# Patient Record
Sex: FEMALE | NOT HISPANIC OR LATINO | Employment: STUDENT | URBAN - METROPOLITAN AREA
[De-identification: names, ages, dates, MRNs, and addresses within clinical notes are randomized per-mention and may not be internally consistent; named-entity substitution may affect disease eponyms.]

---

## 2023-11-01 ENCOUNTER — OFFICE VISIT (OUTPATIENT)
Dept: URGENT CARE | Facility: CLINIC | Age: 33
End: 2023-11-01
Payer: COMMERCIAL

## 2023-11-01 VITALS
HEART RATE: 91 BPM | BODY MASS INDEX: 39.2 KG/M2 | HEIGHT: 71 IN | TEMPERATURE: 99 F | OXYGEN SATURATION: 100 % | SYSTOLIC BLOOD PRESSURE: 123 MMHG | DIASTOLIC BLOOD PRESSURE: 72 MMHG | WEIGHT: 280 LBS | RESPIRATION RATE: 17 BRPM

## 2023-11-01 DIAGNOSIS — J02.0 STREP THROAT: Primary | ICD-10-CM

## 2023-11-01 DIAGNOSIS — J02.9 SORE THROAT: ICD-10-CM

## 2023-11-01 LAB
CTP QC/QA: YES
S PYO RRNA THROAT QL PROBE: POSITIVE

## 2023-11-01 PROCEDURE — 87880 STREP A ASSAY W/OPTIC: CPT | Mod: QW,,,

## 2023-11-01 PROCEDURE — 99213 PR OFFICE/OUTPT VISIT, EST, LEVL III, 20-29 MIN: ICD-10-PCS | Mod: 25,S$GLB,,

## 2023-11-01 PROCEDURE — 99213 OFFICE O/P EST LOW 20 MIN: CPT | Mod: 25,S$GLB,,

## 2023-11-01 PROCEDURE — 87880 POCT RAPID STREP A: ICD-10-PCS | Mod: QW,,,

## 2023-11-01 RX ORDER — CEFDINIR 300 MG/1
300 CAPSULE ORAL 2 TIMES DAILY
Qty: 20 CAPSULE | Refills: 0 | Status: SHIPPED | OUTPATIENT
Start: 2023-11-01 | End: 2023-11-11

## 2023-11-01 RX ORDER — AMOXICILLIN 875 MG/1
875 TABLET, FILM COATED ORAL EVERY 12 HOURS
Qty: 20 TABLET | Refills: 0 | Status: SHIPPED | OUTPATIENT
Start: 2023-11-01 | End: 2023-11-01

## 2023-11-01 NOTE — PROGRESS NOTES
"Subjective:      Patient ID: Kathleen LUCERO is a 33 y.o. female.    Vitals:  height is 5' 11" (1.803 m) and weight is 127 kg (280 lb). Her temperature is 99.2 °F (37.3 °C). Her blood pressure is 123/72 and her pulse is 91. Her respiration is 17 and oxygen saturation is 100%.     Chief Complaint: Sore Throat (Sever throat pain x 1 day)    This is a 33 y.o. female who presents today with a chief complaint of  Patient presents with:  Sore Throat: Sever throat pain x 1 day        Sore Throat   This is a new problem. The current episode started today. The problem has been rapidly worsening. There has been no fever. The fever has been present for Less than 1 day. The pain is at a severity of 8/10. Associated symptoms include trouble swallowing. She has had exposure to strep. She has tried NSAIDs for the symptoms. The treatment provided no relief.       Constitution: Positive for fever.   HENT:  Positive for sore throat and trouble swallowing.    Neck: neck negative.   Cardiovascular: Negative.    Eyes: Negative.    Respiratory: Negative.     Gastrointestinal: Negative.    Endocrine: negative.   Genitourinary: Negative.    Musculoskeletal: Negative.    Skin: Negative.    Allergic/Immunologic: Negative.    Neurological: Negative.    Hematologic/Lymphatic: Negative.    Psychiatric/Behavioral: Negative.        Objective:     Physical Exam   Constitutional: She is oriented to person, place, and time.   HENT:   Head: Normocephalic and atraumatic.   Mouth/Throat: Oropharyngeal exudate and posterior oropharyngeal erythema present.   Eyes: Conjunctivae are normal. Pupils are equal, round, and reactive to light. Extraocular movement intact   Neck: Neck supple.   Cardiovascular: Normal rate and normal pulses.   Pulmonary/Chest: Effort normal.   Abdominal: Normal appearance.   Musculoskeletal: Normal range of motion.         General: Normal range of motion.   Neurological: no focal deficit. She is alert, oriented to person, place, " and time and at baseline.   Skin: Skin is warm.   Psychiatric: Her behavior is normal. Mood, judgment and thought content normal.   Nursing note and vitals reviewed.      Assessment:     Results for orders placed or performed in visit on 11/01/23   POCT rapid strep A   Result Value Ref Range    Rapid Strep A Screen Positive (A) Negative     Acceptable Yes        1. Strep throat    2. Sore throat        Plan:       Strep throat  -     Discontinue: amoxicillin (AMOXIL) 875 MG tablet; Take 1 tablet (875 mg total) by mouth every 12 (twelve) hours. for 10 days  Dispense: 20 tablet; Refill: 0  -     cefdinir (OMNICEF) 300 MG capsule; Take 1 capsule (300 mg total) by mouth 2 (two) times daily. for 10 days  Dispense: 20 capsule; Refill: 0    Sore throat  -     POCT rapid strep A  -     Discontinue: amoxicillin (AMOXIL) 875 MG tablet; Take 1 tablet (875 mg total) by mouth every 12 (twelve) hours. for 10 days  Dispense: 20 tablet; Refill: 0

## 2023-11-01 NOTE — PATIENT INSTRUCTIONS
You must understand that you've received an Urgent Care treatment only and that you may be released before all your medical problems are known or treated. You, the patient, will arrange for follow up care as instructed.  Follow up with your PCP or specialty clinic as directed in the next 1-2 weeks if not improved or as needed.  You can call [phone] to schedule an appointment with the appropriate provider.  If your condition worsens we recommend that you receive another evaluation at the emergency room immediately or contact your primary medical clinics after hours call service to discuss your concerns.  Please return here or go to the Emergency Department for any concerns or worsening of condition.  Please if you smoke please consider quitting. [facility] cessation hotline number is [phone], available at this number is free counseling and medications to live a healthier life!         If you were prescribed a narcotic or controlled medication, do not drive or operate heavy equipment or machinery while taking these medications.

## 2024-01-23 ENCOUNTER — OFFICE VISIT (OUTPATIENT)
Dept: URGENT CARE | Facility: CLINIC | Age: 34
End: 2024-01-23
Payer: COMMERCIAL

## 2024-01-23 VITALS
HEART RATE: 107 BPM | WEIGHT: 280 LBS | TEMPERATURE: 98 F | HEIGHT: 71 IN | RESPIRATION RATE: 18 BRPM | SYSTOLIC BLOOD PRESSURE: 138 MMHG | OXYGEN SATURATION: 98 % | BODY MASS INDEX: 39.2 KG/M2 | DIASTOLIC BLOOD PRESSURE: 85 MMHG

## 2024-01-23 DIAGNOSIS — J02.0 STREP PHARYNGITIS: Primary | ICD-10-CM

## 2024-01-23 DIAGNOSIS — J02.9 SORE THROAT: ICD-10-CM

## 2024-01-23 LAB
CTP QC/QA: YES
MOLECULAR STREP A: POSITIVE

## 2024-01-23 PROCEDURE — 87651 STREP A DNA AMP PROBE: CPT | Mod: QW,,, | Performed by: NURSE PRACTITIONER

## 2024-01-23 PROCEDURE — 99213 OFFICE O/P EST LOW 20 MIN: CPT | Mod: S$GLB,,, | Performed by: NURSE PRACTITIONER

## 2024-01-23 RX ORDER — CEFDINIR 300 MG/1
300 CAPSULE ORAL 2 TIMES DAILY
Qty: 20 CAPSULE | Refills: 0 | Status: SHIPPED | OUTPATIENT
Start: 2024-01-23 | End: 2024-02-02

## 2024-01-23 NOTE — PATIENT INSTRUCTIONS
You must understand that you've received an Urgent Care treatment only and that you may be released before all your medical problems are known or treated. You, the patient, will arrange for follow up care as instructed.  Follow up with your PCP or specialty clinic as directed in the next 1-2 weeks if not improved or as needed.  You can call [phone] to schedule an appointment with the appropriate provider.  If your condition worsens we recommend that you receive another evaluation at the emergency room immediately or contact your primary medical clinics after hours call service to discuss your concerns.  Please return here or go to the Emergency Department for any concerns or worsening of condition.  Please if you smoke please consider quitting. [facility] Smoke cessation hotline number is [phone], available at this number is free counseling and medications to live a healthier life!       If you were prescribed a narcotic or controlled medication, do not drive or operate heavy equipment or machinery while taking these medications.    If you were not prescribed an antibiotic and your not better please return for a recheck. Antibiotic therapy is not always indicated initially.   Please attempt over the counter medications, give it time and try Echinacea, Zinc and Vitamin C to fight common colds and virus.

## 2024-01-23 NOTE — PROGRESS NOTES
"Subjective:       Patient ID: Kathleen Roberto is a 33 y.o. female.    Vitals:  height is 5' 11" (1.803 m) and weight is 127 kg (280 lb). Her oral temperature is 98.2 °F (36.8 °C). Her blood pressure is 138/85 and her pulse is 107. Her respiration is 18 and oxygen saturation is 98%.     Chief Complaint: Sore Throat (Started this morning with a sore throat, now the throat is worse and she is having body aches.  She has been exposed to strep and would like to be tested for strep. She took ibuprofen this morning with no relief.)    This is a 33 y.o. female who presents today with a chief complaint of Started this morning with a sore throat, now the throat is worse and she is having body aches.  She has been exposed to strep and would like to be tested for strep. She took ibuprofen this morning with no relief.  Patient presents with:  Sore Throat: Started this morning with a sore throat, now the throat is worse and she is having body aches.  She has been exposed to strep and would like to be tested for strep. She took ibuprofen this morning with no relief.         Sore Throat   This is a new problem. The current episode started today. The problem has been gradually worsening. The pain is at a severity of 5/10. The pain is moderate. She has had exposure to strep. She has tried NSAIDs for the symptoms. The treatment provided no relief.       HENT:  Positive for sore throat.            Objective:      Physical Exam   Constitutional: She is oriented to person, place, and time. She appears well-developed.   HENT:   Head: Normocephalic and atraumatic.   Ears:   Right Ear: External ear normal.   Left Ear: External ear normal.   Nose: Rhinorrhea present.   Mouth/Throat: Mucous membranes are normal. Posterior oropharyngeal edema and posterior oropharyngeal erythema present.   Eyes: Conjunctivae and lids are normal.   Neck: Trachea normal. Neck supple.   Cardiovascular: Normal rate, regular rhythm and normal heart sounds. "   Pulmonary/Chest: Effort normal and breath sounds normal. No respiratory distress.   Abdominal: Normal appearance and bowel sounds are normal. She exhibits no distension and no mass. Soft. There is no abdominal tenderness.   Musculoskeletal: Normal range of motion.         General: Normal range of motion.   Neurological: She is alert and oriented to person, place, and time. She has normal strength.   Skin: Skin is warm, dry, intact, not diaphoretic and not pale.   Psychiatric: Her speech is normal and behavior is normal. Judgment and thought content normal.   Nursing note and vitals reviewed.        Past medical history and current medications reviewed.       Assessment:           1. Strep pharyngitis    2. Sore throat              Plan:         Strep pharyngitis  -     cefdinir (OMNICEF) 300 MG capsule; Take 1 capsule (300 mg total) by mouth 2 (two) times daily. for 10 days  Dispense: 20 capsule; Refill: 0    Sore throat  -     POCT Strep A, Molecular             Patient Instructions     You must understand that you've received an Urgent Care treatment only and that you may be released before all your medical problems are known or treated. You, the patient, will arrange for follow up care as instructed.  Follow up with your PCP or specialty clinic as directed in the next 1-2 weeks if not improved or as needed.  You can call [phone] to schedule an appointment with the appropriate provider.  If your condition worsens we recommend that you receive another evaluation at the emergency room immediately or contact your primary medical clinics after hours call service to discuss your concerns.  Please return here or go to the Emergency Department for any concerns or worsening of condition.  Please if you smoke please consider quitting. [facility] Smoke cessation hotline number is [phone], available at this number is free counseling and medications to live a healthier life!       If you were prescribed a narcotic  or controlled medication, do not drive or operate heavy equipment or machinery while taking these medications.    If you were not prescribed an antibiotic and your not better please return for a recheck. Antibiotic therapy is not always indicated initially.   Please attempt over the counter medications, give it time and try Echinacea, Zinc and Vitamin C to fight common colds and virus.

## 2024-01-23 NOTE — LETTER
January 23, 2024      [facility]  [address] [place] [address], [address]  [place] [address]  Phone: [phone]  Fax: [phone]       Patient: Kathleen Roberto   YOB: 1990  Date of Visit: 01/23/2024    To Whom It May Concern:    Deanne Roberto  was at [facility] on 01/23/2024. The patient may return to work/school on 01/25/2024 with out   restrictions. If you have any questions or concerns, or if I can be of further assistance, please do not hesitate to contact me.    Sincerely,    ROMANA Ambrosio

## 2024-07-03 ENCOUNTER — OFFICE VISIT (OUTPATIENT)
Dept: URGENT CARE | Facility: CLINIC | Age: 34
End: 2024-07-03
Payer: COMMERCIAL

## 2024-07-03 VITALS
HEIGHT: 71 IN | BODY MASS INDEX: 39.62 KG/M2 | WEIGHT: 283 LBS | TEMPERATURE: 98 F | RESPIRATION RATE: 17 BRPM | DIASTOLIC BLOOD PRESSURE: 74 MMHG | SYSTOLIC BLOOD PRESSURE: 124 MMHG | HEART RATE: 90 BPM | OXYGEN SATURATION: 96 %

## 2024-07-03 DIAGNOSIS — B96.89 ACUTE BACTERIAL SINUSITIS: Primary | ICD-10-CM

## 2024-07-03 DIAGNOSIS — J01.90 ACUTE BACTERIAL SINUSITIS: Primary | ICD-10-CM

## 2024-07-03 DIAGNOSIS — R09.81 SINUS CONGESTION: ICD-10-CM

## 2024-07-03 DIAGNOSIS — R05.1 ACUTE COUGH: ICD-10-CM

## 2024-07-03 LAB
CTP QC/QA: YES
SARS-COV-2 AG RESP QL IA.RAPID: NEGATIVE

## 2024-07-03 PROCEDURE — 87811 SARS-COV-2 COVID19 W/OPTIC: CPT | Mod: QW,S$GLB,, | Performed by: NURSE PRACTITIONER

## 2024-07-03 PROCEDURE — 99213 OFFICE O/P EST LOW 20 MIN: CPT | Mod: S$GLB,,, | Performed by: NURSE PRACTITIONER

## 2024-07-03 RX ORDER — PROMETHAZINE HYDROCHLORIDE AND DEXTROMETHORPHAN HYDROBROMIDE 6.25; 15 MG/5ML; MG/5ML
5 SYRUP ORAL EVERY 6 HOURS PRN
Qty: 120 ML | Refills: 0 | Status: SHIPPED | OUTPATIENT
Start: 2024-07-03 | End: 2024-07-13

## 2024-07-03 RX ORDER — AZITHROMYCIN 250 MG/1
TABLET, FILM COATED ORAL
Qty: 6 TABLET | Refills: 0 | Status: SHIPPED | OUTPATIENT
Start: 2024-07-03

## 2024-07-03 NOTE — PROGRESS NOTES
"Subjective:      Patient ID: Kathleen Roberto is a 34 y.o. female.    Vitals:  height is 5' 11" (1.803 m) and weight is 128.4 kg (283 lb). Her oral temperature is 97.7 °F (36.5 °C). Her blood pressure is 124/74 and her pulse is 90. Her respiration is 17 and oxygen saturation is 96%.     Chief Complaint: Sinus Problem    This is a 34 y.o. female who presents today with a chief complaint of sinus congestion, rhinorrhea, headache, body aches, and sore throat x 2 days.        Sinus Problem  This is a new problem. The current episode started in the past 7 days. The problem has been gradually worsening since onset. There has been no fever. Her pain is at a severity of 4/10. The pain is mild. Associated symptoms include congestion, coughing, ear pain, headaches, sinus pressure, sneezing and a sore throat. Pertinent negatives include no shortness of breath. Treatments tried: Zyrtec-D and NSAIDs. The treatment provided moderate relief.       Constitution: Negative.   HENT:  Positive for ear pain, congestion, sinus pain, sinus pressure and sore throat.    Respiratory:  Positive for cough. Negative for shortness of breath.    Allergic/Immunologic: Positive for sneezing.   Neurological:  Positive for headaches.      Objective:     Physical Exam   Constitutional: She is oriented to person, place, and time. She appears well-developed. She is cooperative.  Non-toxic appearance. She does not appear ill. No distress.   HENT:   Head: Normocephalic and atraumatic.   Ears:   Right Ear: Hearing, tympanic membrane, external ear and ear canal normal.   Left Ear: Hearing, tympanic membrane, external ear and ear canal normal.   Nose: No mucosal edema, rhinorrhea or nasal deformity. No epistaxis. Right sinus exhibits maxillary sinus tenderness and frontal sinus tenderness. Left sinus exhibits maxillary sinus tenderness and frontal sinus tenderness.   Mouth/Throat: Uvula is midline, oropharynx is clear and moist and mucous membranes are normal. " No trismus in the jaw. Normal dentition. No uvula swelling. No oropharyngeal exudate, posterior oropharyngeal edema or posterior oropharyngeal erythema.   Eyes: Conjunctivae and lids are normal. No scleral icterus.   Neck: Trachea normal and phonation normal. Neck supple. No edema present. No erythema present. No neck rigidity present.   Cardiovascular: Normal rate, regular rhythm, normal heart sounds and normal pulses.   Pulmonary/Chest: Effort normal and breath sounds normal. No respiratory distress. She has no decreased breath sounds. She has no rhonchi.   Abdominal: Normal appearance.   Musculoskeletal: Normal range of motion.         General: No deformity. Normal range of motion.   Neurological: She is alert and oriented to person, place, and time. She exhibits normal muscle tone. Coordination normal.   Skin: Skin is warm, dry, intact, not diaphoretic and not pale.   Psychiatric: Her speech is normal and behavior is normal. Judgment and thought content normal.   Nursing note and vitals reviewed.    Results for orders placed or performed in visit on 07/03/24   SARS Coronavirus 2 Antigen, POCT Manual Read   Result Value Ref Range    SARS Coronavirus 2 Antigen Negative Negative     Acceptable Yes       Assessment:     1. Acute bacterial sinusitis    2. Sinus congestion    3. Acute cough        Plan:       Acute bacterial sinusitis  -     azithromycin (Z-ANGELO) 250 MG tablet; Take 2 tablets by mouth on day 1; Then Take 1 tablet by mouth on days 2-5  Dispense: 6 tablet; Refill: 0    Sinus congestion  -     SARS Coronavirus 2 Antigen, POCT Manual Read    Acute cough  -     promethazine-dextromethorphan (PROMETHAZINE-DM) 6.25-15 mg/5 mL Syrp; Take 5 mLs by mouth every 6 (six) hours as needed (cough).  Dispense: 120 mL; Refill: 0                Patient Instructions   Please return here or go to the Emergency Department for any concerns or worsening of condition.  Please drink plenty of fluids.  Please get  plenty of rest.  If you were prescribed antibiotics, please take them to completion.  If you do not have Hypertension or any history of palpitations, it is ok to take over the counter Sudafed or Mucinex D or Allegra-D or Claritin-D or Zyrtec-D.  If you do take one of the above, it is ok to combine that with plain over the counter Mucinex or Allegra or Claritin or Zyrtec.  If for example you are taking Zyrtec -D, you can combine that with Mucinex, but not Mucinex-D.  If you are taking Mucinex-D, you can combine that with plain Allegra or Claritin or Zyrtec.   If you do have Hypertension or palpitations, it is safe to take Coricidin HBP for relief of sinus symptoms.  If not allergic, please take over the counter Tylenol (Acetaminophen) and/or Motrin (Ibuprofen) as directed for control of pain and/or fever.  Please follow up with your primary care doctor or specialist as needed.    If you  smoke, please stop smoking.          Herve Padilla, MACHELLEP-C

## 2024-08-19 ENCOUNTER — OFFICE VISIT (OUTPATIENT)
Dept: URGENT CARE | Facility: CLINIC | Age: 34
End: 2024-08-19
Payer: COMMERCIAL

## 2024-08-19 VITALS
WEIGHT: 293 LBS | RESPIRATION RATE: 19 BRPM | SYSTOLIC BLOOD PRESSURE: 114 MMHG | OXYGEN SATURATION: 98 % | HEART RATE: 74 BPM | DIASTOLIC BLOOD PRESSURE: 73 MMHG | HEIGHT: 71 IN | TEMPERATURE: 99 F | BODY MASS INDEX: 41.02 KG/M2

## 2024-08-19 DIAGNOSIS — R05.9 COUGH, UNSPECIFIED TYPE: ICD-10-CM

## 2024-08-19 DIAGNOSIS — R09.81 NASAL CONGESTION: ICD-10-CM

## 2024-08-19 DIAGNOSIS — U07.1 COVID-19: Primary | ICD-10-CM

## 2024-08-19 LAB
CTP QC/QA: YES
SARS-COV-2 AG RESP QL IA.RAPID: POSITIVE

## 2024-08-19 PROCEDURE — 87811 SARS-COV-2 COVID19 W/OPTIC: CPT | Mod: QW,S$GLB,,

## 2024-08-19 PROCEDURE — 99214 OFFICE O/P EST MOD 30 MIN: CPT | Mod: S$GLB,,,

## 2024-08-19 RX ORDER — PROMETHAZINE HYDROCHLORIDE AND DEXTROMETHORPHAN HYDROBROMIDE 6.25; 15 MG/5ML; MG/5ML
5 SYRUP ORAL EVERY 6 HOURS PRN
Qty: 120 ML | Refills: 0 | Status: SHIPPED | OUTPATIENT
Start: 2024-08-19

## 2024-08-19 RX ORDER — FLUTICASONE PROPIONATE 50 MCG
1 SPRAY, SUSPENSION (ML) NASAL DAILY
Qty: 9.9 ML | Refills: 0 | Status: SHIPPED | OUTPATIENT
Start: 2024-08-19

## 2024-08-19 RX ORDER — LORATADINE AND PSEUDOEPHEDRINE SULFATE 5; 120 MG/1; MG/1
1 TABLET, EXTENDED RELEASE ORAL 2 TIMES DAILY
COMMUNITY
Start: 2024-08-19 | End: 2024-08-29

## 2024-08-19 NOTE — LETTER
August 19, 2024      [facility] - [address], [address]  [place] [address]  Phone: [phone]  Fax: [phone]       Patient: Kathleen Roberto   YOB: 1990  Date of Visit: 08/19/2024    To Whom It May Concern:    Deanne Roberto  was at [facility] on 08/19/2024. The patient may return to work/school on 08/22/2024 with no restrictions. If you have any questions or concerns, or if I can be of further assistance, please do not hesitate to contact me.    Sincerely,    Ivanna Allen, NP

## 2024-08-19 NOTE — PROGRESS NOTES
"Subjective:       Patient ID: Kathleen Roberto is a 34 y.o. female.    Vitals:  height is 5' 11" (1.803 m) and weight is 134.4 kg (296 lb 3 oz). Her oral temperature is 98.5 °F (36.9 °C). Her blood pressure is 114/73 and her pulse is 74. Her respiration is 19 and oxygen saturation is 98%.     Chief Complaint: Sore Throat    This is a 34 y.o. female who presents today with a chief complaint of sore throat, body aches, headache that started about an hour ago.  States daughter has covid. Taking ibuprofen with no relief.  Patient presents with:  Sore Throat         Sore Throat   This is a new problem. The current episode started today. The problem has been gradually worsening. There has been no fever. The pain is at a severity of 4/10. The pain is mild. Associated symptoms include headaches. Exposure to: covid. She has tried NSAIDs for the symptoms. The treatment provided no relief.       Constitution: Negative.   HENT:  Positive for sore throat.    Neck: neck negative.   Cardiovascular: Negative.    Eyes: Negative.    Respiratory: Negative.     Gastrointestinal: Negative.    Endocrine: negative.   Genitourinary: Negative.    Musculoskeletal: Negative.    Skin: Negative.    Allergic/Immunologic: Negative.    Neurological:  Positive for headaches.   Hematologic/Lymphatic: Negative.    Psychiatric/Behavioral: Negative.             Objective:      Physical Exam   Constitutional: She is oriented to person, place, and time.   HENT:   Head: Normocephalic and atraumatic.   Ears:   Right Ear: Tympanic membrane, external ear and ear canal normal.   Left Ear: Tympanic membrane, external ear and ear canal normal.   Nose: Congestion present.   Mouth/Throat: Posterior oropharyngeal erythema present.   Eyes: Conjunctivae are normal. Pupils are equal, round, and reactive to light. Extraocular movement intact   Neck: Neck supple.   Cardiovascular: Normal rate, regular rhythm, normal heart sounds and normal pulses.   Pulmonary/Chest: " Effort normal and breath sounds normal.   Abdominal: Normal appearance.   Musculoskeletal: Normal range of motion.         General: Normal range of motion.   Neurological: no focal deficit. She is alert, oriented to person, place, and time and at baseline.   Skin: Skin is warm.   Psychiatric: Her behavior is normal. Mood, judgment and thought content normal.   Nursing note and vitals reviewed.        Past medical history and current medications reviewed.       Assessment:           1. COVID-19    2. Nasal congestion    3. Cough, unspecified type        Results for orders placed or performed in visit on 08/19/24   SARS Coronavirus 2 Antigen, POCT Manual Read   Result Value Ref Range    SARS Coronavirus 2 Antigen Positive (A) Negative     Acceptable Yes           Plan:         COVID-19  -     promethazine-dextromethorphan (PROMETHAZINE-DM) 6.25-15 mg/5 mL Syrp; Take 5 mLs by mouth every 6 (six) hours as needed (cough).  Dispense: 120 mL; Refill: 0  -     fluticasone propionate (FLONASE) 50 mcg/actuation nasal spray; 1 spray (50 mcg total) by Each Nostril route once daily.  Dispense: 9.9 mL; Refill: 0  -     loratadine-pseudoephedrine 5-120 mg (CLARITIN-D 12 HOUR) 5-120 mg per tablet; Take 1 tablet by mouth 2 (two) times daily. for 10 days    Nasal congestion  -     SARS Coronavirus 2 Antigen, POCT Manual Read  -     promethazine-dextromethorphan (PROMETHAZINE-DM) 6.25-15 mg/5 mL Syrp; Take 5 mLs by mouth every 6 (six) hours as needed (cough).  Dispense: 120 mL; Refill: 0  -     fluticasone propionate (FLONASE) 50 mcg/actuation nasal spray; 1 spray (50 mcg total) by Each Nostril route once daily.  Dispense: 9.9 mL; Refill: 0  -     loratadine-pseudoephedrine 5-120 mg (CLARITIN-D 12 HOUR) 5-120 mg per tablet; Take 1 tablet by mouth 2 (two) times daily. for 10 days    Cough, unspecified type  -     promethazine-dextromethorphan (PROMETHAZINE-DM) 6.25-15 mg/5 mL Syrp; Take 5 mLs by mouth every 6 (six) hours  as needed (cough).  Dispense: 120 mL; Refill: 0  -     fluticasone propionate (FLONASE) 50 mcg/actuation nasal spray; 1 spray (50 mcg total) by Each Nostril route once daily.  Dispense: 9.9 mL; Refill: 0  -     loratadine-pseudoephedrine 5-120 mg (CLARITIN-D 12 HOUR) 5-120 mg per tablet; Take 1 tablet by mouth 2 (two) times daily. for 10 days             Patient Instructions   Please return here or go to the Emergency Department for any concerns or worsening of condition.  Please drink plenty of fluids.  Please get plenty of rest.  If you were prescribed antibiotics, please take them to completion.  If you were given wait & see antibiotics, please wait 5-7 days before taking them, and only take them if your symptoms have worsened or not improved.  If you do begin taking the antibiotics, please take them to completion.  If you were given a steroid shot in the clinic and have also been given a prescription for a steroid such as Prednisone or a Medrol Dose Pack, please begin taking them tomorrow.  If you do not have Hypertension or any history of palpitations, it is ok to take over the counter Sudafed or Mucinex D or Allegra-D or Claritin-D or Zyrtec-D.  If you do take one of the above, it is ok to combine that with plain over the counter Mucinex or Allegra or Claritin or Zyrtec.  If for example you are taking Zyrtec -D, you can combine that with Mucinex, but not Mucinex-D.  If you are taking Mucinex-D, you can combine that with plain Allegra or Claritin or Zyrtec.   If you do have Hypertension or palpitations, it is safe to take Coricidin HBP for relief of sinus symptoms.  If not allergic, please take over the counter Tylenol (Acetaminophen) and/or Motrin (Ibuprofen) as directed for control of pain and/or fever.  Please follow up with your primary care doctor or specialist as needed.    If you  smoke, please stop smoking.